# Patient Record
Sex: MALE | Race: WHITE | NOT HISPANIC OR LATINO
[De-identification: names, ages, dates, MRNs, and addresses within clinical notes are randomized per-mention and may not be internally consistent; named-entity substitution may affect disease eponyms.]

---

## 2017-02-28 ENCOUNTER — APPOINTMENT (OUTPATIENT)
Dept: DERMATOLOGY | Facility: CLINIC | Age: 77
End: 2017-02-28

## 2017-02-28 PROBLEM — Z00.00 ENCOUNTER FOR PREVENTIVE HEALTH EXAMINATION: Status: ACTIVE | Noted: 2017-02-28

## 2017-03-21 ENCOUNTER — APPOINTMENT (OUTPATIENT)
Dept: DERMATOLOGY | Facility: CLINIC | Age: 77
End: 2017-03-21

## 2017-05-23 ENCOUNTER — APPOINTMENT (OUTPATIENT)
Dept: DERMATOLOGY | Facility: CLINIC | Age: 77
End: 2017-05-23

## 2017-10-17 ENCOUNTER — APPOINTMENT (OUTPATIENT)
Dept: DERMATOLOGY | Facility: CLINIC | Age: 77
End: 2017-10-17
Payer: MEDICARE

## 2017-10-17 PROCEDURE — 17003 DESTRUCT PREMALG LES 2-14: CPT

## 2017-10-17 PROCEDURE — 99214 OFFICE O/P EST MOD 30 MIN: CPT | Mod: 25

## 2017-10-17 PROCEDURE — 17000 DESTRUCT PREMALG LESION: CPT

## 2017-12-21 ENCOUNTER — APPOINTMENT (OUTPATIENT)
Dept: DERMATOLOGY | Facility: CLINIC | Age: 77
End: 2017-12-21
Payer: MEDICARE

## 2017-12-21 DIAGNOSIS — C44.310 BASAL CELL CARCINOMA OF SKIN OF UNSPECIFIED PARTS OF FACE: ICD-10-CM

## 2017-12-21 PROCEDURE — 17311 MOHS 1 STAGE H/N/HF/G: CPT

## 2017-12-21 PROCEDURE — 13132 CMPLX RPR F/C/C/M/N/AX/G/H/F: CPT

## 2018-02-12 ENCOUNTER — MOBILE ON CALL (OUTPATIENT)
Age: 78
End: 2018-02-12

## 2019-11-19 ENCOUNTER — INPATIENT (INPATIENT)
Facility: HOSPITAL | Age: 79
LOS: 0 days | Discharge: HOME | DRG: 312 | End: 2019-11-19
Attending: INTERNAL MEDICINE | Admitting: INTERNAL MEDICINE
Payer: COMMERCIAL

## 2019-11-19 VITALS
TEMPERATURE: 98 F | HEIGHT: 72 IN | SYSTOLIC BLOOD PRESSURE: 118 MMHG | RESPIRATION RATE: 16 BRPM | OXYGEN SATURATION: 99 % | DIASTOLIC BLOOD PRESSURE: 79 MMHG | WEIGHT: 149.91 LBS | HEART RATE: 89 BPM

## 2019-11-19 DIAGNOSIS — R55 SYNCOPE AND COLLAPSE: ICD-10-CM

## 2019-11-19 LAB
ALBUMIN SERPL ELPH-MCNC: 4.1 G/DL — SIGNIFICANT CHANGE UP (ref 3.3–5.2)
ALP SERPL-CCNC: 56 U/L — SIGNIFICANT CHANGE UP (ref 40–120)
ALT FLD-CCNC: 44 U/L — HIGH
ANION GAP SERPL CALC-SCNC: 12 MMOL/L — SIGNIFICANT CHANGE UP (ref 5–17)
APPEARANCE UR: CLEAR — SIGNIFICANT CHANGE UP
AST SERPL-CCNC: 45 U/L — HIGH
BASOPHILS # BLD AUTO: 0.04 K/UL — SIGNIFICANT CHANGE UP (ref 0–0.2)
BASOPHILS NFR BLD AUTO: 0.6 % — SIGNIFICANT CHANGE UP (ref 0–2)
BILIRUB SERPL-MCNC: 0.6 MG/DL — SIGNIFICANT CHANGE UP (ref 0.4–2)
BILIRUB UR-MCNC: NEGATIVE — SIGNIFICANT CHANGE UP
BUN SERPL-MCNC: 21 MG/DL — HIGH (ref 8–20)
CALCIUM SERPL-MCNC: 9.5 MG/DL — SIGNIFICANT CHANGE UP (ref 8.6–10.2)
CHLORIDE SERPL-SCNC: 103 MMOL/L — SIGNIFICANT CHANGE UP (ref 98–107)
CO2 SERPL-SCNC: 23 MMOL/L — SIGNIFICANT CHANGE UP (ref 22–29)
COLOR SPEC: YELLOW — SIGNIFICANT CHANGE UP
CREAT SERPL-MCNC: 1.06 MG/DL — SIGNIFICANT CHANGE UP (ref 0.5–1.3)
DIFF PNL FLD: NEGATIVE — SIGNIFICANT CHANGE UP
EOSINOPHIL # BLD AUTO: 0.28 K/UL — SIGNIFICANT CHANGE UP (ref 0–0.5)
EOSINOPHIL NFR BLD AUTO: 4.4 % — SIGNIFICANT CHANGE UP (ref 0–6)
GLUCOSE SERPL-MCNC: 100 MG/DL — SIGNIFICANT CHANGE UP (ref 70–115)
GLUCOSE UR QL: NEGATIVE MG/DL — SIGNIFICANT CHANGE UP
HCT VFR BLD CALC: 48.7 % — SIGNIFICANT CHANGE UP (ref 39–50)
HGB BLD-MCNC: 16.2 G/DL — SIGNIFICANT CHANGE UP (ref 13–17)
IMM GRANULOCYTES NFR BLD AUTO: 0.2 % — SIGNIFICANT CHANGE UP (ref 0–1.5)
KETONES UR-MCNC: NEGATIVE — SIGNIFICANT CHANGE UP
LEUKOCYTE ESTERASE UR-ACNC: NEGATIVE — SIGNIFICANT CHANGE UP
LYMPHOCYTES # BLD AUTO: 1.69 K/UL — SIGNIFICANT CHANGE UP (ref 1–3.3)
LYMPHOCYTES # BLD AUTO: 26.7 % — SIGNIFICANT CHANGE UP (ref 13–44)
MCHC RBC-ENTMCNC: 31 PG — SIGNIFICANT CHANGE UP (ref 27–34)
MCHC RBC-ENTMCNC: 33.3 GM/DL — SIGNIFICANT CHANGE UP (ref 32–36)
MCV RBC AUTO: 93.1 FL — SIGNIFICANT CHANGE UP (ref 80–100)
MONOCYTES # BLD AUTO: 0.99 K/UL — HIGH (ref 0–0.9)
MONOCYTES NFR BLD AUTO: 15.7 % — HIGH (ref 2–14)
NEUTROPHILS # BLD AUTO: 3.31 K/UL — SIGNIFICANT CHANGE UP (ref 1.8–7.4)
NEUTROPHILS NFR BLD AUTO: 52.4 % — SIGNIFICANT CHANGE UP (ref 43–77)
NITRITE UR-MCNC: NEGATIVE — SIGNIFICANT CHANGE UP
PH UR: 6.5 — SIGNIFICANT CHANGE UP (ref 5–8)
PLATELET # BLD AUTO: 157 K/UL — SIGNIFICANT CHANGE UP (ref 150–400)
POTASSIUM SERPL-MCNC: 4.3 MMOL/L — SIGNIFICANT CHANGE UP (ref 3.5–5.3)
POTASSIUM SERPL-SCNC: 4.3 MMOL/L — SIGNIFICANT CHANGE UP (ref 3.5–5.3)
PROT SERPL-MCNC: 6.8 G/DL — SIGNIFICANT CHANGE UP (ref 6.6–8.7)
PROT UR-MCNC: NEGATIVE MG/DL — SIGNIFICANT CHANGE UP
RBC # BLD: 5.23 M/UL — SIGNIFICANT CHANGE UP (ref 4.2–5.8)
RBC # FLD: 12.2 % — SIGNIFICANT CHANGE UP (ref 10.3–14.5)
SODIUM SERPL-SCNC: 138 MMOL/L — SIGNIFICANT CHANGE UP (ref 135–145)
SP GR SPEC: 1.01 — SIGNIFICANT CHANGE UP (ref 1.01–1.02)
TROPONIN T SERPL-MCNC: <0.01 NG/ML — SIGNIFICANT CHANGE UP (ref 0–0.06)
TROPONIN T SERPL-MCNC: <0.01 NG/ML — SIGNIFICANT CHANGE UP (ref 0–0.06)
UROBILINOGEN FLD QL: NEGATIVE MG/DL — SIGNIFICANT CHANGE UP
WBC # BLD: 6.32 K/UL — SIGNIFICANT CHANGE UP (ref 3.8–10.5)
WBC # FLD AUTO: 6.32 K/UL — SIGNIFICANT CHANGE UP (ref 3.8–10.5)

## 2019-11-19 PROCEDURE — 93010 ELECTROCARDIOGRAM REPORT: CPT | Mod: 76

## 2019-11-19 PROCEDURE — 81003 URINALYSIS AUTO W/O SCOPE: CPT

## 2019-11-19 PROCEDURE — 84484 ASSAY OF TROPONIN QUANT: CPT

## 2019-11-19 PROCEDURE — 36415 COLL VENOUS BLD VENIPUNCTURE: CPT

## 2019-11-19 PROCEDURE — 99283 EMERGENCY DEPT VISIT LOW MDM: CPT

## 2019-11-19 PROCEDURE — 99285 EMERGENCY DEPT VISIT HI MDM: CPT | Mod: GC

## 2019-11-19 PROCEDURE — 80053 COMPREHEN METABOLIC PANEL: CPT

## 2019-11-19 PROCEDURE — 93005 ELECTROCARDIOGRAM TRACING: CPT

## 2019-11-19 PROCEDURE — 71046 X-RAY EXAM CHEST 2 VIEWS: CPT

## 2019-11-19 PROCEDURE — 85027 COMPLETE CBC AUTOMATED: CPT

## 2019-11-19 PROCEDURE — 71046 X-RAY EXAM CHEST 2 VIEWS: CPT | Mod: 26

## 2019-11-19 RX ORDER — DOFETILIDE 0.25 MG/1
250 CAPSULE ORAL ONCE
Refills: 0 | Status: COMPLETED | OUTPATIENT
Start: 2019-11-19 | End: 2019-11-19

## 2019-11-19 RX ORDER — CARVEDILOL PHOSPHATE 80 MG/1
3.12 CAPSULE, EXTENDED RELEASE ORAL EVERY 12 HOURS
Refills: 0 | Status: DISCONTINUED | OUTPATIENT
Start: 2019-11-19 | End: 2019-11-19

## 2019-11-19 RX ORDER — LISINOPRIL 2.5 MG/1
10 TABLET ORAL DAILY
Refills: 0 | Status: DISCONTINUED | OUTPATIENT
Start: 2019-11-19 | End: 2019-11-19

## 2019-11-19 RX ADMIN — DOFETILIDE 250 MICROGRAM(S): 0.25 CAPSULE ORAL at 12:36

## 2019-11-19 RX ADMIN — LISINOPRIL 10 MILLIGRAM(S): 2.5 TABLET ORAL at 12:36

## 2019-11-19 NOTE — ED PROVIDER NOTE - OBJECTIVE STATEMENT
80 y/o M pt with hx of MI x2 s/p 2 stents and defibrillator placement presenting today after a near-syncopal episode this morning at home. Pt woke up this morning feeling in his usual state of health but when he was standing in the bathroom he began to feel very lightheaded and diaphoretic. Pt reports he went to the ground, never fully losing conscious, and did not injure himself. Wife called EMS because the pt has had similar episodes in the past, 2 of which resulted in the pt having an MI. Pt denies any chest pain, sob, fevers, n/v, abdominal pain, diarrhea, weakness, headache, or other complaint.    Cardiologist is Dr. Barbosa at [place]. Last MI in Jan 2011

## 2019-11-19 NOTE — ED ADULT TRIAGE NOTE - CHIEF COMPLAINT QUOTE
Pt A&Ox4 states "I got a little dizzy walking back to bathroom and went to my knees."  BIBA c/o fall. Patient denies any LOC, denies hitting head, ambulatory on scene, denies any pain or discomfort at this time.

## 2019-11-19 NOTE — ED PROVIDER NOTE - PATIENT PORTAL LINK FT
You can access the FollowMyHealth Patient Portal offered by [facility] by registering at the following website: [email or web address]. By joining [employer] [facility]’s FollowMyHealth portal, you will also be able to view your health information using other applications (apps) compatible with our system.

## 2019-11-19 NOTE — ED PROVIDER NOTE - PROGRESS NOTE DETAILS
Received call from [employer] regarding pt's device interrogation. No firing activity today. Pending cardio consult for ultimate dispo Received call from [employer] regarding pt's device interrogation. No episodes of vtach or afib w/ rvr today. Pending cardio consult for ultimate dispo Pt cleared by cardiology. Instructing to f/u outpt. Pt comfortable with d/c and understands plan to f/u outpt. Return instructions given.

## 2019-11-19 NOTE — ED ADULT NURSE NOTE - OBJECTIVE STATEMENT
Pt reports sudden onset dizziness leading to syncopal episode, denies hitting head, reports being on blood thinners, unsure of what the name of medication is, reports previous episode 4years ago, reports f/u with Cardiologist, pt denies pain at this time, presents with Left Chest wall defibrillator, MI 2011, reports syncopal episode s/p "feeling dizzy", witnessed by pt spouse, denies dizziness at this time, offers no significant medical complaints

## 2019-11-19 NOTE — ED PROVIDER NOTE - ATTENDING CONTRIBUTION TO CARE
I personally saw the patient with the resident, and completed the key components of the history and physical exam. I then discussed the management plan with the resident.  79 year old male with PMH CAD s/p MI and stents x 2, AICD presents with near syncopal episode, unprovoked while in bathroom, lowered himself to the floor, no head trauma, no chest pain and currently asymptomatic  Gen: NAD, well appearing  CV: RRR  Pul: CTA b/l  Abd: Soft, non-distended, non-tender  Neuro: no focal deficits  Pt with abnormal ekg, afib on unknown chronicity, RBBB, PVCs, high risk syncope, will admit I personally saw the patient with the resident, and completed the key components of the history and physical exam. I then discussed the management plan with the resident.  79 year old male with PMH CAD s/p MI and stents x 2, AICD presents with near syncopal episode, unprovoked while in bathroom, lowered himself to the floor, no head trauma, no chest pain and currently asymptomatic  Gen: NAD, well appearing  CV: RRR  Pul: CTA b/l  Abd: Soft, non-distended, non-tender  Neuro: no focal deficits  Pt with near syncope but no chest pain, trop neg x 2. Afib, which is chronic and pt on xarelto. ekg unchanged from prior as per cardio. cardio has seen and cleared, aicd interogated with no arrhythmias

## 2019-11-19 NOTE — ED PROVIDER NOTE - CLINICAL SUMMARY MEDICAL DECISION MAKING FREE TEXT BOX
78 y/o M pt with hx of MI presenting today after near-syncopal episode. Concerning hx for MI in prior near-syncopal episodes. Will do cardiac w/u with trop, ekg, cxr, cbc, cmp, then reassess.

## 2019-11-19 NOTE — ED ADULT NURSE NOTE - CHPI ED NUR SYMPTOMS NEG
no weakness/no loss of consciousness/no nausea/no numbness/no vomiting/no dizziness/no fever/no change in level of consciousness/no confusion/no blurred vision

## 2019-11-19 NOTE — ED PROVIDER NOTE - PHYSICAL EXAMINATION
Gen: no acute distress  Head: normocephalic, atraumatic  Lung: CTAB, no respiratory distress, no wheezing, rales, rhonchi  CV: normal s1/s2, rrr, no murmurs, Normal perfusion  Abd: soft, non-tender, non-distended  MSK: No edema, no visible deformities, full range of motion in all 4 extremities  Neuro: No focal neurologic deficits  Skin: No rash   Psych: normal affect

## 2019-11-19 NOTE — CONSULT NOTE ADULT - SUBJECTIVE AND OBJECTIVE BOX
[facility]                              [address] [place] [address]                                                 PHONE: [phone]                                                 FAX: [phone]  ------------------------------------------------------------------------------------------------  79y Male with past medical history as under presenting today after a near-syncopal episode this morning at home. Pt woke up this morning feeling in his usual state of health but when he was standing in the bathroom he began to feel very lightheaded and diaphoretic. Pt reports   he went to the ground, never fully losing conscious, and did not injure himself. Pt denies any chest pain, sob, fevers, n/v, abdominal pain, diarrhea, weakness, headache, or other complaint. Altogether the symptoms lasted for about 4 min and since that time he has felt well.  He reports he is very active at baseline and exercises in the gym regularly without limitations. He follows with Dr. Barbosa from [facility] and Dr. Green for EP in [facility].  At the time of evaluation, pt reports feeling well.    PAST MEDICAL & SURGICAL HISTORY:      No Known Allergies      Review of Systems:  Positive for dizziness, pre-syncope  Rest of the systems were reviewed and was negative.     Family history:   Family history in first degree relative of early CAD    Social History:  No smoking   No alcohol  No other drug use    Vital Signs Last 24 Hrs  T(C): 36.4 (19 Nov 2019 06:27), Max: 36.4 (19 Nov 2019 06:27)  T(F): 97.6 (19 Nov 2019 06:27), Max: 97.6 (19 Nov 2019 06:27)  HR: 89 (19 Nov 2019 06:27) (89 - 89)  BP: 118/79 (19 Nov 2019 06:27) (118/79 - 118/79)  BP(mean): --  RR: 16 (19 Nov 2019 06:27) (16 - 16)  SpO2: 99% (19 Nov 2019 06:27) (99% - 99%)    PHYSICAL EXAM:  Constitutional: Appears well developed, well nourished; alert and co-operative  HEENT:     Head: Normocephalic and atraumatic  Eyes: Conjunctiva normal, No scleral icterus  Neck: Supple, No JVD  Mouth/Throat: Mucous membranes are normal. Mucosa are not pale or dry.  Cardiovascular: regular S1, S2  Respiratory: Lungs clear to auscultation; no crepitations, no wheeze  Gastrointestinal:  Soft, Non-tender, + BS	  Musculoskeletal: Normal range of motion. No edema  Skin: Warm and dry. No cyanosis . No diaphoresis.  Neurologic: Alert oriented to time place and person. Normal strength and no tremor.  Psychiatric: Normal mood and affect, Speech is normal and behavior is normal.        LABS:                        16.2   6.32  )-----------( 157      ( 19 Nov 2019 07:28 )             48.7     11-19    138  |  103  |  21.0<H>  ----------------------------<  100  4.3   |  23.0  |  1.06    Ca    9.5      19 Nov 2019 07:28    TPro  6.8  /  Alb  4.1  /  TBili  0.6  /  DBili  x   /  AST  45<H>  /  ALT  44<H>  /  AlkPhos  56  11-19    CARDIAC MARKERS ( 19 Nov 2019 07:28 )  x     / <0.01 ng/mL / x     / x     / x              RADIOLOGY & ADDITIONAL STUDIES: (reviewed)    CARDIOLOGY TESTING:(reviewed)     ECG (Independent visualization): AF with RBBB and PRWP    Telemetry (Independent visualization): sinus bradycardia    Stress test revealing no significant areas of stress-induced ischemia.     Echocardiogram back in October 2016 revealed an ejection fraction of 30% and regional wall motion abnormalities consistent with an ischemic cardiomyopathy. There was mild to moderate MR and TR.    MEDICATIONS:(reviewed)  Home Medications:  Current Medications:  •	carvedilol (COREG) 3.125 MG tablet	TAKE 1 TABLET(3.125 MG) BY MOUTH TWICE DAILY WITH MEALS	  •	clopidogrel (PLAVIX) 75 MG tablet	Take 75 mg by mouth one time daily.	  •	dofetilide (TIKOSYN) 125 MCG capsule	TAKE 1 CAPSULE(125 MCG) BY MOUTH TWICE DAILY	  •	dofetilide (TIKOSYN) 250 MCG capsule	TAKE 1 CAPSULE(250 MCG) BY MOUTH TWICE DAILY	  •	ramipril (ALTACE) 2.5 MG capsule	TAKE 1 CAPSULE(2.5 MG) BY MOUTH EVERY DAY	  •	rivaroxaban (XARELTO) 20 MG TABS	Take 1 tablet (20 mg total) by mouth one time daily..	  •	sildenafil (VIAGRA) 100 MG tablet	Take 1 tablet (100 mg total) by mouth as needed for Erectile Dysfunction..	  •	simvastatin (ZOCOR) 20 MG tablet	TAKE 1 TABLET BY MOUTH EVERY DAY	  •	simvastatin (ZOCOR) 20 MG tablet	TAKE 1 TABLET(20 MG) BY MOUTH EVERY NIGHT	  •	simvastatin (ZOCOR) 20 MG tablet	TAKE 1 TABLET(20 MG) BY MOUTH EVERY NIGHT	  •	spironolactone (ALDACTONE) 25 MG tablet	TAKE 1 TABLET(25 MG) BY MOUTH EVERY DAY	     MEDICATIONS  (STANDING):    MEDICATIONS  (PRN):

## 2019-11-19 NOTE — CONSULT NOTE ADULT - ASSESSMENT
79y Male with prior history of past medical history significant for ASHD status post an anterior wall myocardial infarction back in 2011 requiring an emergent cardiac catheterization with subsequent PCI/stent along the LAD utilizing a 3.0 x 16 mm Titan stent, ischemic cardiomyopathy with chronic systolic congestive heart failure requiring an AICD, paroxysmal atrial fibrillation and a subdural hematoma back in December 2015 due to a fall on Xarelto who presented with near-syncopal episode this morning at home.    Near syncope  - ? related to PAF, now in NSR  - Will interrogate [employer] ICD    ICMP  - Volume status stable  - Continue Coreg, ramipril and aldactone    CAD  - Continue Plavix    HTN  - BP controlled    Dyslipidemia  - Continue statin    AF  - Back in NSR  - Continue Tikosyn  - Check ECG to assess QTc  - On anticoagulation with Xarelto    d/w Dr. Mensah

## 2021-07-22 ENCOUNTER — APPOINTMENT (OUTPATIENT)
Dept: DERMATOLOGY | Facility: CLINIC | Age: 81
End: 2021-07-22
Payer: MEDICARE

## 2021-07-22 ENCOUNTER — RESULT REVIEW (OUTPATIENT)
Age: 81
End: 2021-07-22

## 2021-07-22 PROCEDURE — 99203 OFFICE O/P NEW LOW 30 MIN: CPT | Mod: 25

## 2021-07-22 PROCEDURE — 11102 TANGNTL BX SKIN SINGLE LES: CPT | Mod: 59

## 2021-07-22 PROCEDURE — 11103 TANGNTL BX SKIN EA SEP/ADDL: CPT | Mod: 59

## 2021-07-22 PROCEDURE — 17004 DESTROY PREMAL LESIONS 15/>: CPT

## 2021-08-19 ENCOUNTER — RESULT REVIEW (OUTPATIENT)
Age: 81
End: 2021-08-19

## 2021-08-20 ENCOUNTER — APPOINTMENT (OUTPATIENT)
Dept: DERMATOLOGY | Facility: CLINIC | Age: 81
End: 2021-08-20
Payer: MEDICARE

## 2021-08-20 PROCEDURE — 17261 DSTRJ MAL LES T/A/L .6-1.0CM: CPT

## 2021-08-20 PROCEDURE — 99212 OFFICE O/P EST SF 10 MIN: CPT | Mod: 25

## 2021-08-20 PROCEDURE — 11103 TANGNTL BX SKIN EA SEP/ADDL: CPT | Mod: 59

## 2021-08-20 PROCEDURE — 11102 TANGNTL BX SKIN SINGLE LES: CPT | Mod: 59

## 2023-02-22 ENCOUNTER — APPOINTMENT (OUTPATIENT)
Dept: DERMATOLOGY | Facility: CLINIC | Age: 83
End: 2023-02-22
Payer: MEDICARE

## 2023-02-22 PROCEDURE — 99213 OFFICE O/P EST LOW 20 MIN: CPT | Mod: 25

## 2023-02-22 PROCEDURE — 17003 DESTRUCT PREMALG LES 2-14: CPT

## 2023-02-22 PROCEDURE — 17000 DESTRUCT PREMALG LESION: CPT
